# Patient Record
Sex: FEMALE | URBAN - METROPOLITAN AREA
[De-identification: names, ages, dates, MRNs, and addresses within clinical notes are randomized per-mention and may not be internally consistent; named-entity substitution may affect disease eponyms.]

---

## 2021-05-12 ENCOUNTER — OFFICE VISIT (OUTPATIENT)
Dept: URBAN - METROPOLITAN AREA CLINIC 44 | Facility: CLINIC | Age: 71
End: 2021-05-12
Payer: MEDICARE

## 2021-05-12 DIAGNOSIS — E11.9 TYPE 2 DIABETES MELLITUS W/O COMPLICATION: Primary | ICD-10-CM

## 2021-05-12 DIAGNOSIS — H43.813 VITREOUS DEGENERATION, BILATERAL: ICD-10-CM

## 2021-05-12 DIAGNOSIS — H25.13 AGE-RELATED NUCLEAR CATARACT, BILATERAL: ICD-10-CM

## 2021-05-12 DIAGNOSIS — Z79.899 OTHER LONG TERM DRUG THERAPY: ICD-10-CM

## 2021-05-12 DIAGNOSIS — H04.123 TEAR FILM INSUFFICIENCY OF BILATERAL LACRIMAL GLANDS: ICD-10-CM

## 2021-05-12 PROCEDURE — 92134 CPTRZ OPH DX IMG PST SGM RTA: CPT | Performed by: OPTOMETRIST

## 2021-05-12 PROCEDURE — 92004 COMPRE OPH EXAM NEW PT 1/>: CPT | Performed by: OPTOMETRIST

## 2021-05-12 ASSESSMENT — VISUAL ACUITY
OS: 20/30
OD: 20/25

## 2021-05-12 ASSESSMENT — KERATOMETRY
OS: 42.13
OD: 42.38

## 2021-05-12 ASSESSMENT — INTRAOCULAR PRESSURE
OD: 17
OS: 15

## 2021-05-12 NOTE — IMPRESSION/PLAN
Impression: Type 2 diabetes mellitus w/o complication: D18.9. Per exam and OCT no vascular abnormalities noted. Patient reports A1C and blood sugars under good control. Plan: PLAN: Stressed importance of regular follow ups with PCP and monitoring of A1C and glucose levels. Discussed with patient to maintain A1C at 7.0 or below will greatly decreased chances of retinopathy. RTC 12 months for complete.

## 2021-05-12 NOTE — IMPRESSION/PLAN
Impression: Other long term drug therapy: Z79.899. Per exam and OCT no vascular abnormalities noted. Patient reports A1C and blood sugars under good control.  Plan: See E11.9

## 2021-05-12 NOTE — IMPRESSION/PLAN
Impression: Tear film insufficiency of bilateral lacrimal glands: H04.123. Condition mild. Patient reports no or occasional symptoms. Clinical evaluation shows mild DED. Plan: PLAN: Warm compresses for 10 minutes AM (Karl Mask or equal). Recommend Lipid based tears to be used 4X daily. RTC if symptom's worsen.

## 2021-05-12 NOTE — IMPRESSION/PLAN
Impression: Vitreous degeneration, bilateral: H43.813. Vision is good but patient is experiencing new symptom's of flashes and/or floaters. No Hx of RD. Fundus exam is unremarkable for tears, holes or RD. Discussed with patient S/S of potential RD/RT and to RTC immediately for evaluation Plan: PLAN: RTC STAT  if experiences S/S of RD/RT.